# Patient Record
Sex: MALE | URBAN - METROPOLITAN AREA
[De-identification: names, ages, dates, MRNs, and addresses within clinical notes are randomized per-mention and may not be internally consistent; named-entity substitution may affect disease eponyms.]

---

## 2020-06-11 ENCOUNTER — APPOINTMENT (OUTPATIENT)
Age: 40
Setting detail: DERMATOLOGY
End: 2020-06-11

## 2020-06-11 DIAGNOSIS — L91.8 OTHER HYPERTROPHIC DISORDERS OF THE SKIN: ICD-10-CM

## 2020-06-11 DIAGNOSIS — D22 MELANOCYTIC NEVI: ICD-10-CM

## 2020-06-11 DIAGNOSIS — L40.0 PSORIASIS VULGARIS: ICD-10-CM

## 2020-06-11 DIAGNOSIS — L57.8 OTHER SKIN CHANGES DUE TO CHRONIC EXPOSURE TO NONIONIZING RADIATION: ICD-10-CM

## 2020-06-11 PROBLEM — D22.5 MELANOCYTIC NEVI OF TRUNK: Status: ACTIVE | Noted: 2020-06-11

## 2020-06-11 PROCEDURE — OTHER SKIN TAG REMOVAL MULTI: OTHER

## 2020-06-11 PROCEDURE — 11200 RMVL SKIN TAGS UP TO&INC 15: CPT

## 2020-06-11 PROCEDURE — 99214 OFFICE O/P EST MOD 30 MIN: CPT | Mod: 25

## 2020-06-11 PROCEDURE — OTHER COUNSELING: OTHER

## 2020-06-11 ASSESSMENT — LOCATION DETAILED DESCRIPTION DERM
LOCATION DETAILED: LEFT INFERIOR LATERAL NECK
LOCATION DETAILED: RIGHT SUPERIOR PARIETAL SCALP
LOCATION DETAILED: LEFT MEDIAL UPPER BACK
LOCATION DETAILED: RIGHT CLAVICULAR NECK
LOCATION DETAILED: RIGHT SUPERIOR LATERAL MIDBACK

## 2020-06-11 ASSESSMENT — LOCATION SIMPLE DESCRIPTION DERM
LOCATION SIMPLE: RIGHT LOWER BACK
LOCATION SIMPLE: RIGHT ANTERIOR NECK
LOCATION SIMPLE: LEFT ANTERIOR NECK
LOCATION SIMPLE: SCALP
LOCATION SIMPLE: LEFT UPPER BACK

## 2020-06-11 ASSESSMENT — LOCATION ZONE DERM
LOCATION ZONE: NECK
LOCATION ZONE: TRUNK
LOCATION ZONE: SCALP

## 2020-06-11 NOTE — PROCEDURE: SKIN TAG REMOVAL MULTI
Detail Level: Simple
Total Number Of Lesions Treated: 4
Add Associated Diagnoses If Applicable When Selecting Medical Necessity: Yes
Include Z78.9 (Other Specified Conditions Influencing Health Status) As An Associated Diagnosis?: No
Medical Necessity Clause: This procedure was medically necessary because the lesions that were treated were:
Anesthesia Volume In Cc: 3
Consent: Written consent obtained and the risks of skin tag removal was reviewed with the patient including but not limited to bleeding, pigmentary change, infection, pain, and remote possibility of scarring.
Medical Necessity Information: It is in your best interest to select a reason for this procedure from the list below. All of these items fulfill various CMS LCD requirements except the new and changing color options.

## 2020-06-11 NOTE — PROCEDURE: COUNSELING
Detail Level: Detailed
Patient Specific Counseling (Will Not Stick From Patient To Patient): He has Clobetasol at home he will contu
Detail Level: Generalized
Detail Level: Zone

## 2020-10-12 ENCOUNTER — RX ONLY (RX ONLY)
Age: 40
End: 2020-10-12

## 2020-10-12 RX ORDER — DESONIDE 0.5 MG/G
CREAM TOPICAL
Qty: 60 | Refills: 2 | Status: CANCELLED
Stop reason: CLARIF

## 2021-02-22 ENCOUNTER — APPOINTMENT (OUTPATIENT)
Dept: URBAN - METROPOLITAN AREA CLINIC 259 | Age: 41
Setting detail: DERMATOLOGY
End: 2021-02-22

## 2021-02-22 DIAGNOSIS — L91.8 OTHER HYPERTROPHIC DISORDERS OF THE SKIN: ICD-10-CM

## 2021-02-22 DIAGNOSIS — L57.8 OTHER SKIN CHANGES DUE TO CHRONIC EXPOSURE TO NONIONIZING RADIATION: ICD-10-CM

## 2021-02-22 DIAGNOSIS — L21.8 OTHER SEBORRHEIC DERMATITIS: ICD-10-CM

## 2021-02-22 PROCEDURE — OTHER PRESCRIPTION: OTHER

## 2021-02-22 PROCEDURE — OTHER COUNSELING: OTHER

## 2021-02-22 PROCEDURE — OTHER MIPS QUALITY: OTHER

## 2021-02-22 PROCEDURE — 99214 OFFICE O/P EST MOD 30 MIN: CPT | Mod: 25

## 2021-02-22 PROCEDURE — OTHER SKIN TAG REMOVAL: OTHER

## 2021-02-22 PROCEDURE — 11200 RMVL SKIN TAGS UP TO&INC 15: CPT

## 2021-02-22 RX ORDER — HYDROCORTISONE 25 MG/G
CREAM TOPICAL
Qty: 2 | Refills: 5 | Status: ERX | COMMUNITY
Start: 2021-02-22

## 2021-02-22 RX ORDER — KETOCONAZOLE 20 MG/G
CREAM TOPICAL
Qty: 1 | Refills: 5 | Status: ERX | COMMUNITY
Start: 2021-02-22

## 2021-02-22 ASSESSMENT — LOCATION ZONE DERM
LOCATION ZONE: EYELID
LOCATION ZONE: TRUNK
LOCATION ZONE: SCALP

## 2021-02-22 ASSESSMENT — LOCATION DETAILED DESCRIPTION DERM
LOCATION DETAILED: RIGHT INFERIOR POSTAURICULAR SKIN
LOCATION DETAILED: LEFT CENTRAL POSTAURICULAR SKIN
LOCATION DETAILED: RIGHT MEDIAL SUPERIOR TARSAL REGION
LOCATION DETAILED: RIGHT MEDIAL UPPER BACK

## 2021-02-22 ASSESSMENT — LOCATION SIMPLE DESCRIPTION DERM
LOCATION SIMPLE: SCALP
LOCATION SIMPLE: POSTERIOR SCALP
LOCATION SIMPLE: RIGHT MEDIAL SUPERIOR TARSAL REGION
LOCATION SIMPLE: RIGHT UPPER BACK

## 2021-02-22 NOTE — PROCEDURE: SKIN TAG REMOVAL
Include Z78.9 (Other Specified Conditions Influencing Health Status) As An Associated Diagnosis?: No
Medical Necessity Information: It is in your best interest to select a reason for this procedure from the list below. All of these items fulfill various CMS LCD requirements except the new and changing color options.
Consent: Written consent obtained and the risks of skin tag removal was reviewed with the patient including but not limited to bleeding, pigmentary change, infection, pain, and remote possibility of scarring.
Medical Necessity Clause: This procedure was medically necessary because the lesions that were treated were:
Detail Level: Detailed
Anesthesia Volume In Cc: 3
Add Associated Diagnoses If Applicable When Selecting Medical Necessity: Yes

## 2021-02-22 NOTE — PROCEDURE: MIPS QUALITY
Detail Level: Detailed
Quality 110: Preventive Care And Screening: Influenza Immunization: Influenza Immunization Ordered or Recommended, but not Administered due to system reason
Quality 431: Preventive Care And Screening: Unhealthy Alcohol Use - Screening: Patient screened for unhealthy alcohol use using a single question and scores less than 2 times per year
Quality 130: Documentation Of Current Medications In The Medical Record: Current Medications Documented
Quality 226: Preventive Care And Screening: Tobacco Use: Screening And Cessation Intervention: Patient screened for tobacco use and is an ex/non-smoker

## 2021-06-14 ENCOUNTER — APPOINTMENT (OUTPATIENT)
Dept: URBAN - METROPOLITAN AREA CLINIC 259 | Age: 41
Setting detail: DERMATOLOGY
End: 2021-06-15

## 2021-06-14 DIAGNOSIS — D22 MELANOCYTIC NEVI: ICD-10-CM

## 2021-06-14 DIAGNOSIS — L82.1 OTHER SEBORRHEIC KERATOSIS: ICD-10-CM

## 2021-06-14 DIAGNOSIS — L57.8 OTHER SKIN CHANGES DUE TO CHRONIC EXPOSURE TO NONIONIZING RADIATION: ICD-10-CM

## 2021-06-14 DIAGNOSIS — L40.0 PSORIASIS VULGARIS: ICD-10-CM

## 2021-06-14 PROBLEM — D22.5 MELANOCYTIC NEVI OF TRUNK: Status: ACTIVE | Noted: 2021-06-14

## 2021-06-14 PROCEDURE — OTHER COUNSELING: OTHER

## 2021-06-14 PROCEDURE — 99212 OFFICE O/P EST SF 10 MIN: CPT

## 2021-06-14 PROCEDURE — OTHER MIPS QUALITY: OTHER

## 2021-06-14 ASSESSMENT — LOCATION ZONE DERM
LOCATION ZONE: SCALP
LOCATION ZONE: TRUNK
LOCATION ZONE: ARM

## 2021-06-14 ASSESSMENT — LOCATION DETAILED DESCRIPTION DERM
LOCATION DETAILED: RIGHT SUPERIOR PARIETAL SCALP
LOCATION DETAILED: RIGHT DISTAL DORSAL FOREARM
LOCATION DETAILED: LEFT SUPERIOR UPPER BACK
LOCATION DETAILED: LEFT MEDIAL UPPER BACK

## 2021-06-14 ASSESSMENT — LOCATION SIMPLE DESCRIPTION DERM
LOCATION SIMPLE: LEFT UPPER BACK
LOCATION SIMPLE: SCALP
LOCATION SIMPLE: RIGHT FOREARM

## 2021-06-14 NOTE — PROCEDURE: MIPS QUALITY
Detail Level: Detailed
Quality 110: Preventive Care And Screening: Influenza Immunization: Influenza Immunization previously received during influenza season
Quality 130: Documentation Of Current Medications In The Medical Record: Current Medications Documented
Quality 226: Preventive Care And Screening: Tobacco Use: Screening And Cessation Intervention: Patient screened for tobacco use and is an ex/non-smoker
Quality 431: Preventive Care And Screening: Unhealthy Alcohol Use - Screening: Patient screened for unhealthy alcohol use using a single question and scores less than 2 times per year

## 2022-07-20 ENCOUNTER — APPOINTMENT (OUTPATIENT)
Dept: URBAN - METROPOLITAN AREA CLINIC 259 | Age: 42
Setting detail: DERMATOLOGY
End: 2022-07-21

## 2022-07-20 DIAGNOSIS — L82.1 OTHER SEBORRHEIC KERATOSIS: ICD-10-CM

## 2022-07-20 DIAGNOSIS — D22 MELANOCYTIC NEVI: ICD-10-CM

## 2022-07-20 DIAGNOSIS — L03.01 CELLULITIS OF FINGER: ICD-10-CM

## 2022-07-20 DIAGNOSIS — L81.4 OTHER MELANIN HYPERPIGMENTATION: ICD-10-CM

## 2022-07-20 DIAGNOSIS — Z71.89 OTHER SPECIFIED COUNSELING: ICD-10-CM

## 2022-07-20 DIAGNOSIS — L57.8 OTHER SKIN CHANGES DUE TO CHRONIC EXPOSURE TO NONIONIZING RADIATION: ICD-10-CM

## 2022-07-20 DIAGNOSIS — D18.0 HEMANGIOMA: ICD-10-CM

## 2022-07-20 DIAGNOSIS — L82.0 INFLAMED SEBORRHEIC KERATOSIS: ICD-10-CM

## 2022-07-20 PROBLEM — L03.011 CELLULITIS OF RIGHT FINGER: Status: ACTIVE | Noted: 2022-07-20

## 2022-07-20 PROBLEM — D18.01 HEMANGIOMA OF SKIN AND SUBCUTANEOUS TISSUE: Status: ACTIVE | Noted: 2022-07-20

## 2022-07-20 PROBLEM — D22.5 MELANOCYTIC NEVI OF TRUNK: Status: ACTIVE | Noted: 2022-07-20

## 2022-07-20 PROCEDURE — OTHER PRESCRIPTION: OTHER

## 2022-07-20 PROCEDURE — OTHER LIQUID NITROGEN: OTHER

## 2022-07-20 PROCEDURE — OTHER COUNSELING: OTHER

## 2022-07-20 PROCEDURE — 99213 OFFICE O/P EST LOW 20 MIN: CPT | Mod: 25

## 2022-07-20 PROCEDURE — OTHER MIPS QUALITY: OTHER

## 2022-07-20 PROCEDURE — 17110 DESTRUCT B9 LESION 1-14: CPT

## 2022-07-20 RX ORDER — MUPIROCIN 20 MG/G
OINTMENT TOPICAL
Qty: 22 | Refills: 2 | Status: ERX | COMMUNITY
Start: 2022-07-20

## 2022-07-20 ASSESSMENT — LOCATION SIMPLE DESCRIPTION DERM
LOCATION SIMPLE: RIGHT UPPER BACK
LOCATION SIMPLE: UPPER BACK
LOCATION SIMPLE: RIGHT SMALL FINGER
LOCATION SIMPLE: LEFT UPPER BACK
LOCATION SIMPLE: RIGHT FOREARM

## 2022-07-20 ASSESSMENT — LOCATION DETAILED DESCRIPTION DERM
LOCATION DETAILED: INFERIOR THORACIC SPINE
LOCATION DETAILED: PERIUNGUAL SKIN RIGHT SMALL FINGER
LOCATION DETAILED: LEFT INFERIOR MEDIAL UPPER BACK
LOCATION DETAILED: RIGHT SUPERIOR MEDIAL UPPER BACK
LOCATION DETAILED: RIGHT PROXIMAL DORSAL FOREARM
LOCATION DETAILED: LEFT MEDIAL UPPER BACK

## 2022-07-20 ASSESSMENT — LOCATION ZONE DERM
LOCATION ZONE: FINGER
LOCATION ZONE: ARM
LOCATION ZONE: TRUNK

## 2022-07-20 NOTE — PROCEDURE: LIQUID NITROGEN
Consent: The patient's consent was obtained including but not limited to risks of crusting, scabbing, blistering, scarring, darker or lighter pigmentary change, recurrence, incomplete removal and infection.
Add 52 Modifier (Optional): no
Show Aperture Variable?: Yes
Post-Care Instructions: I reviewed with the patient in detail post-care instructions. Patient is to wear sunprotection, and avoid picking at any of the treated lesions. Pt may apply Vaseline to crusted or scabbing areas.
Medical Necessity Information: It is in your best interest to select a reason for this procedure from the list below. All of these items fulfill various CMS LCD requirements except the new and changing color options.
Medical Necessity Clause: This procedure was medically necessary because the lesions that were treated were:
Spray Paint Text: The liquid nitrogen was applied to the skin utilizing a spray paint frosting technique.
Detail Level: Simple

## 2022-07-20 NOTE — PROCEDURE: MIPS QUALITY
Quality 431: Preventive Care And Screening: Unhealthy Alcohol Use - Screening: Patient not identified as an unhealthy alcohol user when screened for unhealthy alcohol use using a systematic screening method
Quality 130: Documentation Of Current Medications In The Medical Record: Current Medications Documented
Quality 110: Preventive Care And Screening: Influenza Immunization: Influenza Immunization Ordered or Recommended, but not Administered due to system reason
Quality 226: Preventive Care And Screening: Tobacco Use: Screening And Cessation Intervention: Patient screened for tobacco use and is an ex/non-smoker
Detail Level: Generalized

## 2022-07-20 NOTE — PROCEDURE: COUNSELING
Detail Level: Generalized
Detail Level: Detailed
Detail Level: Simple
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  If affected area not resolving after using Mupirocin patient can call clinic for oral abx
Detail Level: Zone

## 2023-03-23 ENCOUNTER — TRANSCRIBE ORDERS (OUTPATIENT)
Dept: OTHER | Age: 43
End: 2023-03-23

## 2023-03-23 DIAGNOSIS — R10.12 LEFT UPPER QUADRANT PAIN: Primary | ICD-10-CM

## 2023-04-14 ENCOUNTER — THERAPY VISIT (OUTPATIENT)
Dept: PHYSICAL THERAPY | Facility: CLINIC | Age: 43
End: 2023-04-14
Attending: NURSE PRACTITIONER
Payer: COMMERCIAL

## 2023-04-14 DIAGNOSIS — R20.0 NUMBNESS OF LEFT ANTERIOR THIGH: ICD-10-CM

## 2023-04-14 DIAGNOSIS — M54.42 CHRONIC BILATERAL LOW BACK PAIN WITH LEFT-SIDED SCIATICA: ICD-10-CM

## 2023-04-14 DIAGNOSIS — G89.29 CHRONIC BILATERAL LOW BACK PAIN WITH LEFT-SIDED SCIATICA: ICD-10-CM

## 2023-04-14 PROCEDURE — 97161 PT EVAL LOW COMPLEX 20 MIN: CPT | Mod: GP

## 2023-04-14 PROCEDURE — 97110 THERAPEUTIC EXERCISES: CPT | Mod: GP

## 2023-04-14 NOTE — PROGRESS NOTES
"Patient did not return to physical therapy and was lost follow up.  Current status of patient is unknown at this point.  Please see last therapy session for last reported subjective and/or objective information.  Patient is formally discharged from physical therapy.    Physical Therapy Initial Evaluation  Subjective:  The history is provided by the patient.   Patient Health History  Tyler Childress being seen for back/upper leg numbness on L.     Problem began: 4/14/2022.   Problem occurred: getting worse/constant, not sure   Pain is reported as 3/10 on pain scale.  General health as reported by patient is good.  Pertinent medical history includes: asthma, overweight and numbness/tingling.            Current medications:  Anti-depressants. Other medications details: Wegovg, testosterone, weight.    Current occupation is IT.   Primary job tasks include:  Computer work and prolonged sitting.                  Therapist Generated HPI Evaluation  Problem details: Patient enjoys golfing - pain with driving and hitting - having numbness during golf.  He also noticed it while playing hockey.    He reports almost constant numbnes - varying intensity.  Walking makes it worse.  Has tried some massage on it but hasn't worked.     Patient works IT- sits majority of day, as well as frequent travel for work.  Numbness in anterior and lateral quad.  Does have general back soreness and tightness and thinks.    Mild numbness with walking/sitting - more numbness/dead leg feeling with activity of any sorts (running, hockey, golf).  .                                                        Objective:  System    Physical Exam    General     ROS    Lumbar Spine Evaluation  Static Posture  Standing posture: No obvious findings  Sitting posture: Fair, no obvious findings    Dynamic Movement Screen  Single leg stance observations: Eyes open no significant findings - x20\" each  Double limb squat observations: Poor hip/glute loading and hip " "add/IR bilaterally, but NE on L thigh/low back pain.  Single limb squat observations: Good technique/no significant findings x6 reps SL stepdown on 6\" box and no effects   Gait: No significant findings    Hip Joint Screen Negative, minimal hip ER loss bilaterally, moderate hip IR loss bilaterally    Trunk Range of Motion  Movement Loss Major Moderate Minimal Nil Pain   Flexion   x     Extension  X - pain into L thigh      Sidebending R    x    Sidebending L    x    Side Gliding R    x    Side Gliding L    x    Thoracic Rotation R        Thoracic Rotation L          Test Movements  Rep FIS     Rep EIS     Rep PEDRO LUIS Increases; 2x10 DKTC Worse   Rep EIL Decreases, no worse Better     Flexibility Left Right   Quadriceps moderate moderate   Hamstrings trivial trivial   Figure 4 none/WNL none/WNL   Skyler Moderate for iliopsoas and RF Moderate for iliopsoas and RF   **Prone quad PROM end range produced low back pain (anterior pelvic rotation with RF op)      Hip and Knee Strength   MMT Left Right   Hip Abduction nt/5 nt/5   Hip Extension nt/5 nt/5   Hip ER 5/5 5/5          Special Tests  Spring testing: Positive - Lumbar PA mob grade IV produced low back pain locally  Neural tension: Positive with SLR bilaterally L>R, negative prone neural tension for femoral nerve  SI joint test:   Prone instability test:   Myotomes: Negative and WNL (5/5 strength for all)  Dermatomes: Positive on left L1-L3    Palpation:  Moderate tenderness to palpation at proximal greater trochanter    Assessment/Plan:  Patient is a 43 year old male with lumbar and left thigh complaints.      Tyler demonstrates L thigh numbness (anterior and lateral quadriceps/ITB region).  Lumbar extension produced shooting pain to L thigh, and repeated motion testing altered patient symptoms indicating lumbar etiology.  Patient also demonstrates increased tightness of Rectus femoris/iliopsoas bilaterally which appeared to alter low back pain upon stretch.  Patient " educated on repeated motion interventions, and plan to assess response before further testing for peripheral nerve entrapment etiology.       Patient has the following significant findings with corresponding treatment plan.                Diagnosis 1:  Lumbar radiculopathy  Pain -  hot/cold therapy, manual therapy, self management, education, directional preference exercise and home program  Decreased ROM/flexibility - manual therapy, therapeutic exercise and home program  Decreased joint mobility - manual therapy, therapeutic exercise and home program  Decreased proprioception - neuro re-education, therapeutic activities and home program  Decreased function - therapeutic activities and home program    Therapy Evaluation Codes:         Cumulative Therapy Evaluation is: Low complexity.    Previous and current functional limitations:  (See Goal Flow Sheet for this information)    Short term and Long term goals: (See Goal Flow Sheet for this information)     Communication ability:  Patient appears to be able to clearly communicate and understand verbal and written communication and follow directions correctly.  Treatment Explanation - The following has been discussed with the patient:   RX ordered/plan of care  Anticipated outcomes  Possible risks and side effects  This patient would benefit from PT intervention to resume normal activities.   Rehab potential is good.    Frequency:  2 X a month, once daily  Duration:  for 4 months  Discharge Plan:  Achieve all LTG.  Independent in home treatment program.  Reach maximal therapeutic benefit.    Please refer to the daily flowsheet for treatment today, total treatment time and time spent performing 1:1 timed codes.

## 2023-07-25 PROBLEM — M54.42 CHRONIC BILATERAL LOW BACK PAIN WITH LEFT-SIDED SCIATICA: Status: RESOLVED | Noted: 2023-04-14 | Resolved: 2023-07-25

## 2023-07-25 PROBLEM — G89.29 CHRONIC BILATERAL LOW BACK PAIN WITH LEFT-SIDED SCIATICA: Status: RESOLVED | Noted: 2023-04-14 | Resolved: 2023-07-25

## 2023-07-25 PROBLEM — R20.0 NUMBNESS OF LEFT ANTERIOR THIGH: Status: RESOLVED | Noted: 2023-04-14 | Resolved: 2023-07-25

## 2024-12-19 ENCOUNTER — APPOINTMENT (OUTPATIENT)
Dept: URBAN - METROPOLITAN AREA CLINIC 259 | Age: 44
Setting detail: DERMATOLOGY
End: 2024-12-30

## 2024-12-19 DIAGNOSIS — L40.0 PSORIASIS VULGARIS: ICD-10-CM

## 2024-12-19 DIAGNOSIS — L57.8 OTHER SKIN CHANGES DUE TO CHRONIC EXPOSURE TO NONIONIZING RADIATION: ICD-10-CM

## 2024-12-19 DIAGNOSIS — D22 MELANOCYTIC NEVI: ICD-10-CM

## 2024-12-19 DIAGNOSIS — L72.0 EPIDERMAL CYST: ICD-10-CM

## 2024-12-19 DIAGNOSIS — L81.4 OTHER MELANIN HYPERPIGMENTATION: ICD-10-CM

## 2024-12-19 DIAGNOSIS — Z71.89 OTHER SPECIFIED COUNSELING: ICD-10-CM

## 2024-12-19 DIAGNOSIS — L85.3 XEROSIS CUTIS: ICD-10-CM

## 2024-12-19 DIAGNOSIS — D18.0 HEMANGIOMA: ICD-10-CM

## 2024-12-19 DIAGNOSIS — L82.1 OTHER SEBORRHEIC KERATOSIS: ICD-10-CM

## 2024-12-19 PROBLEM — D22.5 MELANOCYTIC NEVI OF TRUNK: Status: ACTIVE | Noted: 2024-12-19

## 2024-12-19 PROBLEM — D18.01 HEMANGIOMA OF SKIN AND SUBCUTANEOUS TISSUE: Status: ACTIVE | Noted: 2024-12-19

## 2024-12-19 PROCEDURE — 99213 OFFICE O/P EST LOW 20 MIN: CPT

## 2024-12-19 PROCEDURE — OTHER OTC TREATMENT REGIMEN: OTHER

## 2024-12-19 PROCEDURE — OTHER COUNSELING: OTHER

## 2024-12-19 PROCEDURE — OTHER MIPS QUALITY: OTHER

## 2024-12-19 ASSESSMENT — LOCATION ZONE DERM
LOCATION ZONE: TRUNK
LOCATION ZONE: EYELID
LOCATION ZONE: SCALP
LOCATION ZONE: LEG

## 2024-12-19 ASSESSMENT — LOCATION SIMPLE DESCRIPTION DERM
LOCATION SIMPLE: RIGHT PRETIBIAL REGION
LOCATION SIMPLE: LEFT PRETIBIAL REGION
LOCATION SIMPLE: POSTERIOR SCALP
LOCATION SIMPLE: RIGHT MEDIAL SUPERIOR TARSAL REGION
LOCATION SIMPLE: LEFT UPPER BACK

## 2024-12-19 ASSESSMENT — LOCATION DETAILED DESCRIPTION DERM
LOCATION DETAILED: RIGHT DISTAL PRETIBIAL REGION
LOCATION DETAILED: POSTERIOR MID-PARIETAL SCALP
LOCATION DETAILED: LEFT DISTAL PRETIBIAL REGION
LOCATION DETAILED: RIGHT MEDIAL SUPERIOR TARSAL REGION
LOCATION DETAILED: LEFT MEDIAL UPPER BACK
LOCATION DETAILED: LEFT SUPERIOR MEDIAL UPPER BACK

## 2024-12-19 NOTE — PROCEDURE: OTC TREATMENT REGIMEN
Patient Specific Otc Recommendations (Will Not Stick From Patient To Patient): Recommended to use moisturizer daily and rinse off after using the jacuzzi
Detail Level: Zone

## 2024-12-19 NOTE — HPI: FULL BODY SKIN EXAMINATION
What Type Of Note Output Would You Prefer (Optional)?: Standard Output
What Is The Reason For Today's Visit?: Full Body Skin Examination
What Is The Reason For Today's Visit? (Being Monitored For X): concerning skin lesions on a periodic basis
Additional History: Pt reports noticing a spot on their left eyelid and spots on the back that they would like checked today. Pt reports no other concerns at this time.